# Patient Record
Sex: FEMALE | Race: BLACK OR AFRICAN AMERICAN | URBAN - METROPOLITAN AREA
[De-identification: names, ages, dates, MRNs, and addresses within clinical notes are randomized per-mention and may not be internally consistent; named-entity substitution may affect disease eponyms.]

---

## 2023-05-16 ENCOUNTER — NURSE TRIAGE (OUTPATIENT)
Dept: ADMINISTRATIVE | Facility: CLINIC | Age: 68
End: 2023-05-16

## 2023-05-16 ENCOUNTER — HOSPITAL ENCOUNTER (OUTPATIENT)
Facility: OTHER | Age: 68
Discharge: LEFT BEFORE DISCHARGE | End: 2023-05-17
Attending: HOSPITALIST | Admitting: HOSPITALIST
Payer: MEDICARE

## 2023-05-16 ENCOUNTER — OFFICE VISIT (OUTPATIENT)
Dept: URGENT CARE | Facility: CLINIC | Age: 68
End: 2023-05-16
Payer: MEDICARE

## 2023-05-16 VITALS
RESPIRATION RATE: 16 BRPM | BODY MASS INDEX: 32.58 KG/M2 | DIASTOLIC BLOOD PRESSURE: 94 MMHG | OXYGEN SATURATION: 96 % | WEIGHT: 220 LBS | HEART RATE: 76 BPM | SYSTOLIC BLOOD PRESSURE: 169 MMHG | TEMPERATURE: 99 F | HEIGHT: 69 IN

## 2023-05-16 VITALS
RESPIRATION RATE: 18 BRPM | WEIGHT: 220 LBS | SYSTOLIC BLOOD PRESSURE: 180 MMHG | BODY MASS INDEX: 32.49 KG/M2 | HEART RATE: 64 BPM | TEMPERATURE: 99 F | DIASTOLIC BLOOD PRESSURE: 97 MMHG | OXYGEN SATURATION: 97 %

## 2023-05-16 DIAGNOSIS — R06.02 SHORTNESS OF BREATH: ICD-10-CM

## 2023-05-16 DIAGNOSIS — R07.9 CHEST PAIN, UNSPECIFIED TYPE: Primary | ICD-10-CM

## 2023-05-16 DIAGNOSIS — R12 HEARTBURN: ICD-10-CM

## 2023-05-16 DIAGNOSIS — Z53.29 LEFT AGAINST MEDICAL ADVICE: ICD-10-CM

## 2023-05-16 DIAGNOSIS — R91.8 PULMONARY MASS: Primary | ICD-10-CM

## 2023-05-16 DIAGNOSIS — R94.31 ABNORMAL EKG: ICD-10-CM

## 2023-05-16 DIAGNOSIS — R07.9 CHEST PAIN: ICD-10-CM

## 2023-05-16 LAB
ALBUMIN SERPL BCP-MCNC: 3.5 G/DL (ref 3.5–5.2)
ALP SERPL-CCNC: 97 U/L (ref 55–135)
ALT SERPL W/O P-5'-P-CCNC: 11 U/L (ref 10–44)
ANION GAP SERPL CALC-SCNC: 7 MMOL/L (ref 8–16)
AST SERPL-CCNC: 18 U/L (ref 10–40)
BASOPHILS # BLD AUTO: 0.02 K/UL (ref 0–0.2)
BASOPHILS NFR BLD: 0.3 % (ref 0–1.9)
BILIRUB SERPL-MCNC: 0.3 MG/DL (ref 0.1–1)
BNP SERPL-MCNC: 38 PG/ML (ref 0–99)
BUN SERPL-MCNC: 11 MG/DL (ref 8–23)
CALCIUM SERPL-MCNC: 9.3 MG/DL (ref 8.7–10.5)
CHLORIDE SERPL-SCNC: 104 MMOL/L (ref 95–110)
CO2 SERPL-SCNC: 27 MMOL/L (ref 23–29)
CREAT SERPL-MCNC: 1.1 MG/DL (ref 0.5–1.4)
D DIMER PPP IA.FEU-MCNC: 0.63 MG/L FEU
DIFFERENTIAL METHOD: ABNORMAL
EOSINOPHIL # BLD AUTO: 0.1 K/UL (ref 0–0.5)
EOSINOPHIL NFR BLD: 2 % (ref 0–8)
ERYTHROCYTE [DISTWIDTH] IN BLOOD BY AUTOMATED COUNT: 13.1 % (ref 11.5–14.5)
EST. GFR  (NO RACE VARIABLE): 54 ML/MIN/1.73 M^2
GLUCOSE SERPL-MCNC: 133 MG/DL (ref 70–110)
HCT VFR BLD AUTO: 39.1 % (ref 37–48.5)
HGB BLD-MCNC: 12.4 G/DL (ref 12–16)
IMM GRANULOCYTES # BLD AUTO: 0.01 K/UL (ref 0–0.04)
IMM GRANULOCYTES NFR BLD AUTO: 0.2 % (ref 0–0.5)
INR PPP: 0.9 (ref 0.8–1.2)
LYMPHOCYTES # BLD AUTO: 1.9 K/UL (ref 1–4.8)
LYMPHOCYTES NFR BLD: 31.9 % (ref 18–48)
MCH RBC QN AUTO: 29.7 PG (ref 27–31)
MCHC RBC AUTO-ENTMCNC: 31.7 G/DL (ref 32–36)
MCV RBC AUTO: 94 FL (ref 82–98)
MONOCYTES # BLD AUTO: 0.5 K/UL (ref 0.3–1)
MONOCYTES NFR BLD: 8.4 % (ref 4–15)
NEUTROPHILS # BLD AUTO: 3.4 K/UL (ref 1.8–7.7)
NEUTROPHILS NFR BLD: 57.2 % (ref 38–73)
NRBC BLD-RTO: 0 /100 WBC
PLATELET # BLD AUTO: 287 K/UL (ref 150–450)
PMV BLD AUTO: 8.9 FL (ref 9.2–12.9)
POTASSIUM SERPL-SCNC: 3.5 MMOL/L (ref 3.5–5.1)
PROT SERPL-MCNC: 7.9 G/DL (ref 6–8.4)
PROTHROMBIN TIME: 10.4 SEC (ref 9–12.5)
RBC # BLD AUTO: 4.18 M/UL (ref 4–5.4)
SODIUM SERPL-SCNC: 138 MMOL/L (ref 136–145)
TROPONIN I SERPL DL<=0.01 NG/ML-MCNC: 0.01 NG/ML (ref 0–0.03)
WBC # BLD AUTO: 5.95 K/UL (ref 3.9–12.7)

## 2023-05-16 PROCEDURE — 85379 FIBRIN DEGRADATION QUANT: CPT | Performed by: NURSE PRACTITIONER

## 2023-05-16 PROCEDURE — 93010 EKG 12-LEAD: ICD-10-PCS | Mod: S$PBB,,, | Performed by: INTERNAL MEDICINE

## 2023-05-16 PROCEDURE — 99203 PR OFFICE/OUTPT VISIT, NEW, LEVL III, 30-44 MIN: ICD-10-PCS | Mod: S$GLB,,, | Performed by: NURSE PRACTITIONER

## 2023-05-16 PROCEDURE — 85025 COMPLETE CBC W/AUTO DIFF WBC: CPT | Performed by: EMERGENCY MEDICINE

## 2023-05-16 PROCEDURE — 25500020 PHARM REV CODE 255: Performed by: NURSE PRACTITIONER

## 2023-05-16 PROCEDURE — 80053 COMPREHEN METABOLIC PANEL: CPT | Performed by: EMERGENCY MEDICINE

## 2023-05-16 PROCEDURE — 93010 ELECTROCARDIOGRAM REPORT: CPT | Mod: S$PBB,,, | Performed by: INTERNAL MEDICINE

## 2023-05-16 PROCEDURE — 83880 ASSAY OF NATRIURETIC PEPTIDE: CPT | Performed by: EMERGENCY MEDICINE

## 2023-05-16 PROCEDURE — 99285 EMERGENCY DEPT VISIT HI MDM: CPT | Mod: 25

## 2023-05-16 PROCEDURE — 93005 EKG 12-LEAD: ICD-10-PCS | Mod: S$GLB,,, | Performed by: NURSE PRACTITIONER

## 2023-05-16 PROCEDURE — 93005 ELECTROCARDIOGRAM TRACING: CPT | Mod: S$GLB,,, | Performed by: NURSE PRACTITIONER

## 2023-05-16 PROCEDURE — 25000003 PHARM REV CODE 250: Performed by: NURSE PRACTITIONER

## 2023-05-16 PROCEDURE — 84484 ASSAY OF TROPONIN QUANT: CPT | Performed by: EMERGENCY MEDICINE

## 2023-05-16 PROCEDURE — 99203 OFFICE O/P NEW LOW 30 MIN: CPT | Mod: S$GLB,,, | Performed by: NURSE PRACTITIONER

## 2023-05-16 PROCEDURE — 85610 PROTHROMBIN TIME: CPT | Performed by: EMERGENCY MEDICINE

## 2023-05-16 RX ORDER — AMLODIPINE BESYLATE 5 MG/1
5 TABLET ORAL
Status: COMPLETED | OUTPATIENT
Start: 2023-05-16 | End: 2023-05-16

## 2023-05-16 RX ADMIN — AMLODIPINE BESYLATE 5 MG: 5 TABLET ORAL at 11:05

## 2023-05-16 RX ADMIN — IOHEXOL 100 ML: 350 INJECTION, SOLUTION INTRAVENOUS at 10:05

## 2023-05-16 NOTE — PROGRESS NOTES
"Subjective:      Patient ID: Mar Sparrow is a 68 y.o. female.    Vitals:  height is 5' 9" (1.753 m) and weight is 99.8 kg (220 lb). Her oral temperature is 98.8 °F (37.1 °C). Her blood pressure is 169/94 (abnormal) and her pulse is 76. Her respiration is 16 and oxygen saturation is 96%.     Chief Complaint: Chest Pain    Patient reports chest pain/discomfort and SOB started 1 1/2 weeks ago.    68-year-old female presents to clinic with complaints of new onset chest pain, shortness of breath with exertion, dizziness, headache started approximately 10 days ago. Her chest pain is constant and she is feeling some heartburn. B/p 181/103, 169/94, HR 76, 69, EKG normal sinus rhythm, left axis deviation, nonsprecific T wave abnoramlity. She reports no history of ever having a pcp most care has been thru the ER. Blood pressure has been elevated in the past never treated with medication. Recommend ER. Family member is her for transport. She had a chat with the on-call nurse today approximately 5 hours ago with instructions to report to the emergency department.     Chest Pain   This is a new problem. The current episode started 1 to 4 weeks ago (1 1/2 weeks). The onset quality is undetermined. The problem occurs constantly. The problem has been gradually worsening. The pain is at a severity of 6/10. The quality of the pain is described as pressure (discomfort). The pain does not radiate. Associated symptoms include dizziness, headaches and shortness of breath. Pertinent negatives include no nausea. The pain is aggravated by exertion. She has tried nothing for the symptoms.     Cardiovascular:  Positive for chest pain and sob on exertion.   Respiratory:  Positive for shortness of breath.    Gastrointestinal:  Positive for heartburn. Negative for nausea.   Neurological:  Positive for dizziness and headaches.    Objective:     Physical Exam   Constitutional: She is oriented to person, place, and time. She appears " well-developed. She is cooperative.  Non-toxic appearance. She does not appear ill. No distress.   HENT:   Head: Normocephalic and atraumatic.   Ears:   Right Ear: Hearing and external ear normal.   Left Ear: Hearing and external ear normal.   Nose: Nose normal. No mucosal edema, rhinorrhea or nasal deformity. No epistaxis. Right sinus exhibits no maxillary sinus tenderness and no frontal sinus tenderness. Left sinus exhibits no maxillary sinus tenderness and no frontal sinus tenderness.   Mouth/Throat: Uvula is midline, oropharynx is clear and moist and mucous membranes are normal. No trismus in the jaw. Normal dentition. No uvula swelling. No posterior oropharyngeal erythema.   Eyes: Conjunctivae and lids are normal.   Neck: Trachea normal and phonation normal. Neck supple.   Cardiovascular: Normal rate, regular rhythm and normal heart sounds.   Pulmonary/Chest: Effort normal and breath sounds normal. No respiratory distress.   Abdominal: Normal appearance and bowel sounds are normal. flat abdomen   Musculoskeletal: Normal range of motion.         General: No deformity. Normal range of motion.   Neurological: She is alert and oriented to person, place, and time. She exhibits normal muscle tone. Coordination normal.   Skin: Skin is warm, dry, intact, not diaphoretic and not pale.   Psychiatric: Her speech is normal and behavior is normal. Judgment and thought content normal.   Nursing note and vitals reviewed.    Assessment:     1. Chest pain, unspecified type    2. Shortness of breath    3. Heartburn    4. Abnormal EKG        Plan:       Chest pain, unspecified type  -     IN OFFICE EKG 12-LEAD (to Muse)    Shortness of breath    Heartburn    Abnormal EKG      Due to the high risk of complications the patient is being sent to the emergency room for further evaluation, treatment and possible hospitalization.     I chat via Epic  with Dr. Thomas to discuss the treatment options for the patient.     Patient decline  transport via EMS, will be transported via car by family member who is with her in the clinic

## 2023-05-16 NOTE — Clinical Note
Diagnosis: Chest pain [[id number]]   Future Attending Provider: JOCELINE SHAIKH [[id number]]   Admitting Provider:: JOCELINE SHAIKH [[id number]]

## 2023-05-16 NOTE — TELEPHONE ENCOUNTER
Patient c/o a new onset of SOB with exertion and chest pain. Her chest pain is constant and varies in intensity with activity. Advised per protocol to go to the nearest ED now for further evaluation. Patient VU. Advised the patient to call back with any further questions or if symptoms worsen.        Reason for Disposition   Chest pain lasting longer than 5 minutes and occurred in last 3 days (72 hours) (Exception: feels exactly the same as previously diagnosed heartburn and has accompanying sour taste in mouth)    Additional Information   Negative: SEVERE difficulty breathing (e.g., struggling for each breath, speaks in single words)   Negative: Passed out (i.e., fainted, collapsed and was not responding)   Negative: Difficult to awaken or acting confused (e.g., disoriented, slurred speech)   Negative: Shock suspected (e.g., cold/pale/clammy skin, too weak to stand, low BP, rapid pulse)   Negative: Chest pain lasting longer than 5 minutes and ANY of the following:* Over 44 years old* Over 30 years old and at least one cardiac risk factor (e.g., diabetes mellitus, high blood pressure, high cholesterol, smoker, or strong family history of heart disease)* History of heart disease (i.e., angina, heart attack, heart failure, bypass surgery, takes nitroglycerin)* Pain is crushing, pressure-like, or heavy   Negative: Heart beating < 50 beats per minute OR > 140 beats per minute   Negative: Visible sweat on face or sweat dripping down face   Negative: Sounds like a life-threatening emergency to the triager   Negative: SEVERE chest pain   Negative: Pain also in shoulder(s) or arm(s) or jaw   Negative: Difficulty breathing   Negative: Illness requiring prolonged bedrest in past month (e.g., immobilization, long hospital stay)   Negative: Major surgery in the past month   Negative: Hip or leg fracture (broken bone) in past month (or had cast on leg or ankle in past month)   Negative: Cocaine use within last 3 days   Negative:  Long-distance travel in past month (e.g., car, bus, train, plane; with trip lasting 6 or more hours)   Negative: History of prior 'blood clot' in leg or lungs (i.e., deep vein thrombosis, pulmonary embolism)   Negative: History of inherited increased risk of blood clots (e.g., Factor 5 Leiden, Anti-thrombin 3, Protein C or Protein S deficiency, Prothrombin mutation)   Negative: Cancer treatment in the past two months (or has cancer now)   Negative: Heart beating irregularly or very rapidly    Protocols used: Chest Pain-A-[place]

## 2023-05-17 PROBLEM — R91.8 PULMONARY MASS: Status: ACTIVE | Noted: 2023-05-17

## 2023-05-17 RX ORDER — SODIUM CHLORIDE 0.9 % (FLUSH) 0.9 %
10 SYRINGE (ML) INJECTION EVERY 8 HOURS PRN
Status: DISCONTINUED | OUTPATIENT
Start: 2023-05-17 | End: 2023-05-17 | Stop reason: HOSPADM

## 2023-05-17 RX ORDER — NALOXONE HCL 0.4 MG/ML
0.02 VIAL (ML) INJECTION
Status: DISCONTINUED | OUTPATIENT
Start: 2023-05-17 | End: 2023-05-17 | Stop reason: HOSPADM

## 2023-05-17 RX ORDER — ONDANSETRON 2 MG/ML
4 INJECTION INTRAMUSCULAR; INTRAVENOUS EVERY 8 HOURS PRN
Status: DISCONTINUED | OUTPATIENT
Start: 2023-05-17 | End: 2023-05-17 | Stop reason: HOSPADM

## 2023-05-17 RX ORDER — ACETAMINOPHEN 325 MG/1
650 TABLET ORAL EVERY 4 HOURS PRN
Status: DISCONTINUED | OUTPATIENT
Start: 2023-05-17 | End: 2023-05-17 | Stop reason: HOSPADM

## 2023-05-17 RX ORDER — HEPARIN SODIUM 5000 [USP'U]/ML
5000 INJECTION, SOLUTION INTRAVENOUS; SUBCUTANEOUS EVERY 8 HOURS
Status: DISCONTINUED | OUTPATIENT
Start: 2023-05-17 | End: 2023-05-17 | Stop reason: HOSPADM

## 2023-05-17 NOTE — HPI
The patient is a 68-year-old female with no significant past medical history who presents for evaluation of intermittent pressure like chest pain for 10 days.  Unsure of anything that makes the pain better or worse.  Also reports shortness of breath only with exertion.  Reports that she does not frequently see a doctor and does not take daily medications.  Denies URI symptoms or cough.  Denies fever.  Denies abdominal pain, nausea, vomiting, diarrhea.  Denies urinary symptoms.  The patient is noted to desaturate with ambulation.  On CT, the patient has a perihilar mass.  She will be admitted for further management of her likely lung neoplasm and Pulmonology consult.

## 2023-05-17 NOTE — ED TRIAGE NOTES
Pt presents to the ER with complaints of  intermittent chest pain/tightness with sob onset 1 week. Pt reports chest pain and sob is worse with exertion. No other complaints at this time.

## 2023-05-17 NOTE — ED PROVIDER NOTES
Encounter Date: 5/16/2023       History     Chief Complaint   Patient presents with    Chest Pain     Non-radiating Intermittent left sided chest pain; reproducible upon exertion X1 week.   UC visit today recommended further evaluation at the ED w/ hypertension noted.    (-)pmh of hypertension  (-)abd pain dizziness, weakness or NVD at this time  (-)neuro deficits     Chief complaint: Chest pain    68-year-old female presents for evaluation of chest pain times 10 days.  Intermittent.  Described as pressure.  Unsure of anything that makes the pain better or worse.  Also reports shortness of breath only with exertion.  Reports that she does not frequently see a doctor and does not take daily medications.  Hypertension is noted in triage.  Denies URI symptoms or cough.  Denies fever.  Denies abdominal pain, nausea, vomiting, diarrhea.  Denies urinary symptoms.  Denies rash.  Denies history of MI or blood clot in the past.  Denies cancer history.    This is the extent of patient's complaints for this ER encounter.     The history is provided by the patient.   Review of patient's allergies indicates:   Allergen Reactions    Amoxicillin      History reviewed. No pertinent past medical history.  Past Surgical History:   Procedure Laterality Date    THYROID SURGERY       Family History   Family history unknown: Yes     Social History     Tobacco Use    Smoking status: Never    Smokeless tobacco: Never   Substance Use Topics    Alcohol use: Never    Drug use: Never     Review of Systems  Per HPI.    Physical Exam     Initial Vitals [05/16/23 2019]   BP Pulse Resp Temp SpO2   (!) 202/86 68 17 98.6 °F (37 °C) 97 %      MAP       --         Physical Exam    Vitals reviewed.  Constitutional: No distress.   HENT:   Head: Normocephalic and atraumatic.   Nose: Nose normal.   Mouth/Throat: Oropharynx is clear and moist and mucous membranes are normal.   Eyes: Conjunctivae, EOM and lids are normal. Right pupil is round. Left pupil is  round. Pupils are equal.   Cardiovascular:  Normal rate, regular rhythm and normal heart sounds.           Pulmonary/Chest: Effort normal and breath sounds normal. She has no wheezes. She exhibits no tenderness.   SOB with exertion   Musculoskeletal:         General: Normal range of motion.     Neurological: She is alert and oriented to person, place, and time. She has normal strength.   Skin: Skin is warm and dry. No rash noted.   Psychiatric: She has a normal mood and affect. Her speech is normal and behavior is normal.       ED Course   Procedures  Labs Reviewed   CBC W/ AUTO DIFFERENTIAL - Abnormal; Notable for the following components:       Result Value    MCHC 31.7 (*)     MPV 8.9 (*)     All other components within normal limits   COMPREHENSIVE METABOLIC PANEL - Abnormal; Notable for the following components:    Glucose 133 (*)     Anion Gap 7 (*)     eGFR 54 (*)     All other components within normal limits   D DIMER, QUANTITATIVE - Abnormal; Notable for the following components:    D-Dimer 0.63 (*)     All other components within normal limits   B-TYPE NATRIURETIC PEPTIDE   TROPONIN I   PROTIME-INR          Imaging Results               CTA Chest Non-Coronary (PE Studies) (Final result)  Result time 05/16/23 23:01:43      Final result by Juwan Webb MD (05/16/23 23:01:43)                   Impression:      Right upper lobe suprahilar soft tissue mass with extension to the right hilum and mediastinum consistent with a neoplastic process.  See above comments.  Follow-up recommended.    No evidence of pulmonary embolism.    This report was flagged in Epic as abnormal.      Electronically signed by: Juwan Webb  Date:    05/16/2023  Time:    23:01               Narrative:    EXAMINATION:  CTA CHEST NON CORONARY (PE STUDIES)    CLINICAL HISTORY:  Lung mass, SOB with walking sat 90% on RA, d dimer pending;    TECHNIQUE:  Low dose axial images, sagittal and coronal reformations were obtained from the  thoracic inlet to the lung bases following the IV administration of 100 mL of Omnipaque 350.  Contrast timing was optimized to evaluate the pulmonary arteries.  MIP images were performed.    COMPARISON:  None    FINDINGS:  Pulmonary arteries:Pulmonary arteries are adequately enhanced.No filling defects to indicate pulmonary embolism.    Thoracic soft tissues: Unremarkable.    Aorta: Left-sided aortic arch.  No aneurysm or dissection.    Heart: Normal size. No effusion.    Miranda/Mediastinum: Few probable metastatic paratracheal mediastinal lymph nodes anteriorly.    Airways: Patent.    Lungs/Pleura: The large right upper lobe pulmonary mass with extension to the right hilum and mediastinum measuring approximately 5.5 x 9.2 x 7.4 cm on axial 186 series 2 and coronal 119..  The mass is consistent with a neoplastic process.  Few adjacent mildly enlarged mediastinal lymph nodes.  The mass crosses the midline in the subcarinal region.    The mass abuts the right main pulmonary artery and likely obstructs a right upper lobe segmental pulmonary artery which is blunted at the origin on coronal 110.  There is narrowing and or displacement of other right upper lobe pulmonary arteries.  The mass surrounds the distal right main bronchus with narrowing of a right upper lobe segmental bronchus.    Esophagus: Unremarkable.    Upper Abdomen: No abnormality of the partially imaged upper abdomen.    Bones: No acute fracture. No suspicious lytic or sclerotic lesions.                                        X-Ray Chest PA And Lateral (Final result)  Result time 05/16/23 21:38:29      Final result by Livier Nash MD (05/16/23 21:38:29)                   Impression:      Large right perihilar mass.    This report was flagged in Epic as abnormal.      Electronically signed by: Livier Nash MD  Date:    05/16/2023  Time:    21:38               Narrative:    EXAMINATION:  XR CHEST PA AND LATERAL    CLINICAL HISTORY:  Chest  "Pain;    TECHNIQUE:  PA and lateral views of the chest were performed.    COMPARISON:  None    FINDINGS:  Cardiac silhouette is normal in size.  Lungs are symmetrically expanded.  Right perihilar 8 x 5 cm mass is visualized.  Left lung is relatively clear.  No pneumothorax or large pleural effusion seen.  No acute osseous abnormality identified.                                       Medications   sodium chloride 0.9% flush 10 mL (has no administration in time range)   acetaminophen tablet 650 mg (has no administration in time range)   naloxone 0.4 mg/mL injection 0.02 mg (has no administration in time range)   heparin (porcine) injection 5,000 Units (has no administration in time range)   ondansetron injection 4 mg (has no administration in time range)   iohexoL (OMNIPAQUE 350) injection 100 mL (100 mLs Intravenous Given 5/16/23 2208)   amLODIPine tablet 5 mg (5 mg Oral Given 5/16/23 2354)     Medical Decision Making:   Initial Assessment:   68-year-old female presents for evaluation of intermittent chest pain x1 week with shortness of breath on exertion.  Hypertension is noted in triage without prior diagnosis.  No daily medications.  Differential Diagnosis:   CAD, musculoskeletal strain, pneumonia, CHF, uncontrolled hypertension  Clinical Tests:   Lab Tests: Ordered  Radiological Study: Ordered  Medical Tests: Ordered  ED Management:  Chest pain workup initiated including labs, EKG, chest x-ray.    Refer to patient course below for additional management.            ED Course as of 05/17/23 0034   Tue May 16, 2023   2151 BP(!): 202/86 [EW]   2151 BP(!): 186/93 [EW]   2151 SpO2(!): 90 %  Ambulatory saturation. [EW]   2151 BNP: 38 [EW]   2151 Troponin I: 0.006 [EW]   2152 X-Ray Chest PA And Lateral(!)  "Large right perihilar mass." Discussed with MD; PE study ordered.  [EW]   2208 EKG:  Normal sinus rhythm.  Heart rate 70.  No STEMI. [EW]   2325 CTA Chest Non-Coronary (PE Studies)(!)  "Lungs/Pleura: The large right " "upper lobe pulmonary mass with extension to the right hilum and mediastinum measuring approximately 5.5 x 9.2 x 7.4 cm on axial 186 series 2 and coronal 119..  The mass is consistent with a neoplastic process.  Few adjacent mildly enlarged mediastinal lymph nodes.  The mass crosses the midline in the subcarinal region.     The mass abuts the right main pulmonary artery and likely obstructs a right upper lobe segmental pulmonary artery which is blunted at the origin on coronal 110.  There is narrowing and or displacement of other right upper lobe pulmonary arteries.  The mass surrounds the distal right main bronchus with narrowing of a right upper lobe segmental bronchus." [EW]   Wed May 17, 2023   0014 CT scan results discussed with patient and family members.  I feel patient would benefit from Hospital Medicine admission with p.r.n. oxygen and appropriate consults with eval for home O2. Earlier, her oxygen saturation dropped to 90% with ambulation and again dropped to 89-90% when talking to me.  Would also benefit from BP monitoring/eval of medication started. Patient is adamant that she does not want to be admitted and would rather a 2nd opinion.  Will sign out against medical advice. Conversation witnessed by RNx2. AMA paper filled out.  [EW]      ED Course User Index  [EW] Nicole Chong NP                   Clinical Impression:   Final diagnoses:  [R07.9] Chest pain  [R91.8] Pulmonary mass (Primary)  [Z53.29] Left against medical advice        ED Disposition Condition    AMA Stable                Nicole Chong NP  05/17/23 0034    "

## 2023-05-17 NOTE — ASSESSMENT & PLAN NOTE
CTA- Right upper lobe suprahilar soft tissue mass with extension to the right hilum and mediastinum consistent with a neoplastic process.  See above comments.  Follow-up recommended.  No evidence of pulmonary embolism.    Consult Pulmonology  Oxygen  Consult Social work